# Patient Record
Sex: MALE | Race: WHITE | NOT HISPANIC OR LATINO | URBAN - METROPOLITAN AREA
[De-identification: names, ages, dates, MRNs, and addresses within clinical notes are randomized per-mention and may not be internally consistent; named-entity substitution may affect disease eponyms.]

---

## 2024-10-05 ENCOUNTER — APPOINTMENT (OUTPATIENT)
Dept: GENERAL RADIOLOGY | Facility: HOSPITAL | Age: 64
End: 2024-10-05
Payer: MEDICARE

## 2024-10-05 ENCOUNTER — HOSPITAL ENCOUNTER (INPATIENT)
Facility: HOSPITAL | Age: 64
LOS: 2 days | Discharge: HOME | End: 2024-10-07
Attending: EMERGENCY MEDICINE | Admitting: STUDENT IN AN ORGANIZED HEALTH CARE EDUCATION/TRAINING PROGRAM
Payer: MEDICARE

## 2024-10-05 DIAGNOSIS — I21.11 ST ELEVATION MYOCARDIAL INFARCTION INVOLVING RIGHT CORONARY ARTERY: ICD-10-CM

## 2024-10-05 DIAGNOSIS — I21.3 ST ELEVATION MYOCARDIAL INFARCTION (STEMI), UNSPECIFIED ARTERY: Primary | ICD-10-CM

## 2024-10-05 PROBLEM — F17.210 CONTINUOUS DEPENDENCE ON CIGARETTE SMOKING: Status: ACTIVE | Noted: 2021-11-03

## 2024-10-05 LAB
ACT BLD: 348 SECONDS (ref 89–137)
ALBUMIN SERPL-MCNC: 4.1 G/DL (ref 3.5–5.2)
ALBUMIN/GLOB SERPL: 1.3 G/DL
ALP SERPL-CCNC: 92 U/L (ref 39–117)
ALT SERPL W P-5'-P-CCNC: 28 U/L (ref 1–41)
ANION GAP SERPL CALCULATED.3IONS-SCNC: 13.8 MMOL/L (ref 5–15)
ANION GAP SERPL CALCULATED.3IONS-SCNC: 7.8 MMOL/L (ref 5–15)
AST SERPL-CCNC: 24 U/L (ref 1–40)
BASOPHILS # BLD AUTO: 0.08 10*3/MM3 (ref 0–0.2)
BASOPHILS NFR BLD AUTO: 0.8 % (ref 0–1.5)
BILIRUB SERPL-MCNC: 0.7 MG/DL (ref 0–1.2)
BUN SERPL-MCNC: 13 MG/DL (ref 8–23)
BUN SERPL-MCNC: 14 MG/DL (ref 8–23)
BUN/CREAT SERPL: 14.9 (ref 7–25)
BUN/CREAT SERPL: 17.7 (ref 7–25)
CALCIUM SPEC-SCNC: 8.9 MG/DL (ref 8.6–10.5)
CALCIUM SPEC-SCNC: 9.2 MG/DL (ref 8.6–10.5)
CHLORIDE SERPL-SCNC: 99 MMOL/L (ref 98–107)
CHLORIDE SERPL-SCNC: 99 MMOL/L (ref 98–107)
CO2 SERPL-SCNC: 23.2 MMOL/L (ref 22–29)
CO2 SERPL-SCNC: 23.2 MMOL/L (ref 22–29)
CREAT SERPL-MCNC: 0.79 MG/DL (ref 0.76–1.27)
CREAT SERPL-MCNC: 0.87 MG/DL (ref 0.76–1.27)
DEPRECATED RDW RBC AUTO: 42.3 FL (ref 37–54)
EGFRCR SERPLBLD CKD-EPI 2021: 96.4 ML/MIN/1.73
EGFRCR SERPLBLD CKD-EPI 2021: 99.2 ML/MIN/1.73
EOSINOPHIL # BLD AUTO: 0.35 10*3/MM3 (ref 0–0.4)
EOSINOPHIL NFR BLD AUTO: 3.6 % (ref 0.3–6.2)
ERYTHROCYTE [DISTWIDTH] IN BLOOD BY AUTOMATED COUNT: 13.3 % (ref 12.3–15.4)
GEN 5 2HR TROPONIN T REFLEX: 338 NG/L
GLOBULIN UR ELPH-MCNC: 3.2 GM/DL
GLUCOSE SERPL-MCNC: 137 MG/DL (ref 65–99)
GLUCOSE SERPL-MCNC: 184 MG/DL (ref 65–99)
HCT VFR BLD AUTO: 47.4 % (ref 37.5–51)
HGB BLD-MCNC: 16.2 G/DL (ref 13–17.7)
HOLD SPECIMEN: NORMAL
HOLD SPECIMEN: NORMAL
IMM GRANULOCYTES # BLD AUTO: 0.05 10*3/MM3 (ref 0–0.05)
IMM GRANULOCYTES NFR BLD AUTO: 0.5 % (ref 0–0.5)
LIPASE SERPL-CCNC: 42 U/L (ref 13–60)
LYMPHOCYTES # BLD AUTO: 1.99 10*3/MM3 (ref 0.7–3.1)
LYMPHOCYTES NFR BLD AUTO: 20.5 % (ref 19.6–45.3)
MAGNESIUM SERPL-MCNC: 2 MG/DL (ref 1.6–2.4)
MCH RBC QN AUTO: 29.8 PG (ref 26.6–33)
MCHC RBC AUTO-ENTMCNC: 34.2 G/DL (ref 31.5–35.7)
MCV RBC AUTO: 87.1 FL (ref 79–97)
MONOCYTES # BLD AUTO: 0.63 10*3/MM3 (ref 0.1–0.9)
MONOCYTES NFR BLD AUTO: 6.5 % (ref 5–12)
NEUTROPHILS NFR BLD AUTO: 6.61 10*3/MM3 (ref 1.7–7)
NEUTROPHILS NFR BLD AUTO: 68.1 % (ref 42.7–76)
NRBC BLD AUTO-RTO: 0 /100 WBC (ref 0–0.2)
NT-PROBNP SERPL-MCNC: 70.5 PG/ML (ref 0–900)
PLATELET # BLD AUTO: 209 10*3/MM3 (ref 140–450)
PMV BLD AUTO: 9.5 FL (ref 6–12)
POTASSIUM SERPL-SCNC: 4.1 MMOL/L (ref 3.5–5.2)
POTASSIUM SERPL-SCNC: 4.1 MMOL/L (ref 3.5–5.2)
PROT SERPL-MCNC: 7.3 G/DL (ref 6–8.5)
QT INTERVAL: 400 MS
QT INTERVAL: 449 MS
QTC INTERVAL: 431 MS
QTC INTERVAL: 442 MS
RBC # BLD AUTO: 5.44 10*6/MM3 (ref 4.14–5.8)
SODIUM SERPL-SCNC: 130 MMOL/L (ref 136–145)
SODIUM SERPL-SCNC: 136 MMOL/L (ref 136–145)
TROPONIN T DELTA: 307 NG/L
TROPONIN T SERPL HS-MCNC: 31 NG/L
WBC NRBC COR # BLD AUTO: 9.71 10*3/MM3 (ref 3.4–10.8)
WHOLE BLOOD HOLD COAG: NORMAL
WHOLE BLOOD HOLD SPECIMEN: NORMAL

## 2024-10-05 PROCEDURE — C1757 CATH, THROMBECTOMY/EMBOLECT: HCPCS | Performed by: STUDENT IN AN ORGANIZED HEALTH CARE EDUCATION/TRAINING PROGRAM

## 2024-10-05 PROCEDURE — C9606 PERC D-E COR REVASC W AMI S: HCPCS | Performed by: STUDENT IN AN ORGANIZED HEALTH CARE EDUCATION/TRAINING PROGRAM

## 2024-10-05 PROCEDURE — C1769 GUIDE WIRE: HCPCS | Performed by: STUDENT IN AN ORGANIZED HEALTH CARE EDUCATION/TRAINING PROGRAM

## 2024-10-05 PROCEDURE — C1887 CATHETER, GUIDING: HCPCS | Performed by: STUDENT IN AN ORGANIZED HEALTH CARE EDUCATION/TRAINING PROGRAM

## 2024-10-05 PROCEDURE — 93005 ELECTROCARDIOGRAM TRACING: CPT | Performed by: STUDENT IN AN ORGANIZED HEALTH CARE EDUCATION/TRAINING PROGRAM

## 2024-10-05 PROCEDURE — 99223 1ST HOSP IP/OBS HIGH 75: CPT | Performed by: INTERNAL MEDICINE

## 2024-10-05 PROCEDURE — C1753 CATH, INTRAVAS ULTRASOUND: HCPCS | Performed by: STUDENT IN AN ORGANIZED HEALTH CARE EDUCATION/TRAINING PROGRAM

## 2024-10-05 PROCEDURE — 99152 MOD SED SAME PHYS/QHP 5/>YRS: CPT | Performed by: STUDENT IN AN ORGANIZED HEALTH CARE EDUCATION/TRAINING PROGRAM

## 2024-10-05 PROCEDURE — 36415 COLL VENOUS BLD VENIPUNCTURE: CPT | Performed by: STUDENT IN AN ORGANIZED HEALTH CARE EDUCATION/TRAINING PROGRAM

## 2024-10-05 PROCEDURE — 25010000002 MIDAZOLAM PER 1MG: Performed by: STUDENT IN AN ORGANIZED HEALTH CARE EDUCATION/TRAINING PROGRAM

## 2024-10-05 PROCEDURE — 25510000001 IOPAMIDOL PER 1 ML: Performed by: STUDENT IN AN ORGANIZED HEALTH CARE EDUCATION/TRAINING PROGRAM

## 2024-10-05 PROCEDURE — 25010000002 HEPARIN (PORCINE) PER 1000 UNITS: Performed by: STUDENT IN AN ORGANIZED HEALTH CARE EDUCATION/TRAINING PROGRAM

## 2024-10-05 PROCEDURE — 4A023N7 MEASUREMENT OF CARDIAC SAMPLING AND PRESSURE, LEFT HEART, PERCUTANEOUS APPROACH: ICD-10-PCS | Performed by: STUDENT IN AN ORGANIZED HEALTH CARE EDUCATION/TRAINING PROGRAM

## 2024-10-05 PROCEDURE — C1725 CATH, TRANSLUMIN NON-LASER: HCPCS | Performed by: STUDENT IN AN ORGANIZED HEALTH CARE EDUCATION/TRAINING PROGRAM

## 2024-10-05 PROCEDURE — B2151ZZ FLUOROSCOPY OF LEFT HEART USING LOW OSMOLAR CONTRAST: ICD-10-PCS | Performed by: STUDENT IN AN ORGANIZED HEALTH CARE EDUCATION/TRAINING PROGRAM

## 2024-10-05 PROCEDURE — 93458 L HRT ARTERY/VENTRICLE ANGIO: CPT | Performed by: STUDENT IN AN ORGANIZED HEALTH CARE EDUCATION/TRAINING PROGRAM

## 2024-10-05 PROCEDURE — 25010000002 ATROPINE SULFATE 1 MG/10ML SOLUTION PREFILLED SYRINGE: Performed by: STUDENT IN AN ORGANIZED HEALTH CARE EDUCATION/TRAINING PROGRAM

## 2024-10-05 PROCEDURE — 80053 COMPREHEN METABOLIC PANEL: CPT | Performed by: EMERGENCY MEDICINE

## 2024-10-05 PROCEDURE — 83880 ASSAY OF NATRIURETIC PEPTIDE: CPT | Performed by: EMERGENCY MEDICINE

## 2024-10-05 PROCEDURE — B2111ZZ FLUOROSCOPY OF MULTIPLE CORONARY ARTERIES USING LOW OSMOLAR CONTRAST: ICD-10-PCS | Performed by: STUDENT IN AN ORGANIZED HEALTH CARE EDUCATION/TRAINING PROGRAM

## 2024-10-05 PROCEDURE — 71045 X-RAY EXAM CHEST 1 VIEW: CPT

## 2024-10-05 PROCEDURE — 92941 PRQ TRLML REVSC TOT OCCL AMI: CPT | Performed by: STUDENT IN AN ORGANIZED HEALTH CARE EDUCATION/TRAINING PROGRAM

## 2024-10-05 PROCEDURE — 25010000002 HEPARIN (PORCINE) PER 1000 UNITS: Performed by: EMERGENCY MEDICINE

## 2024-10-05 PROCEDURE — 83735 ASSAY OF MAGNESIUM: CPT | Performed by: EMERGENCY MEDICINE

## 2024-10-05 PROCEDURE — 25010000002 HYDROMORPHONE 1 MG/ML SOLUTION: Performed by: EMERGENCY MEDICINE

## 2024-10-05 PROCEDURE — 92978 ENDOLUMINL IVUS OCT C 1ST: CPT | Performed by: STUDENT IN AN ORGANIZED HEALTH CARE EDUCATION/TRAINING PROGRAM

## 2024-10-05 PROCEDURE — 027034Z DILATION OF CORONARY ARTERY, ONE ARTERY WITH DRUG-ELUTING INTRALUMINAL DEVICE, PERCUTANEOUS APPROACH: ICD-10-PCS | Performed by: STUDENT IN AN ORGANIZED HEALTH CARE EDUCATION/TRAINING PROGRAM

## 2024-10-05 PROCEDURE — C1874 STENT, COATED/COV W/DEL SYS: HCPCS | Performed by: STUDENT IN AN ORGANIZED HEALTH CARE EDUCATION/TRAINING PROGRAM

## 2024-10-05 PROCEDURE — 85347 COAGULATION TIME ACTIVATED: CPT

## 2024-10-05 PROCEDURE — C1760 CLOSURE DEV, VASC: HCPCS | Performed by: STUDENT IN AN ORGANIZED HEALTH CARE EDUCATION/TRAINING PROGRAM

## 2024-10-05 PROCEDURE — C1894 INTRO/SHEATH, NON-LASER: HCPCS | Performed by: STUDENT IN AN ORGANIZED HEALTH CARE EDUCATION/TRAINING PROGRAM

## 2024-10-05 PROCEDURE — 83690 ASSAY OF LIPASE: CPT | Performed by: EMERGENCY MEDICINE

## 2024-10-05 PROCEDURE — 92973 PRQ TRLUML C MCHN ASP THRMBC: CPT | Performed by: STUDENT IN AN ORGANIZED HEALTH CARE EDUCATION/TRAINING PROGRAM

## 2024-10-05 PROCEDURE — 99291 CRITICAL CARE FIRST HOUR: CPT

## 2024-10-05 PROCEDURE — 02C00ZZ EXTIRPATION OF MATTER FROM CORONARY ARTERY, ONE ARTERY, OPEN APPROACH: ICD-10-PCS | Performed by: STUDENT IN AN ORGANIZED HEALTH CARE EDUCATION/TRAINING PROGRAM

## 2024-10-05 PROCEDURE — 99223 1ST HOSP IP/OBS HIGH 75: CPT | Performed by: STUDENT IN AN ORGANIZED HEALTH CARE EDUCATION/TRAINING PROGRAM

## 2024-10-05 PROCEDURE — 93005 ELECTROCARDIOGRAM TRACING: CPT | Performed by: EMERGENCY MEDICINE

## 2024-10-05 PROCEDURE — 84484 ASSAY OF TROPONIN QUANT: CPT | Performed by: EMERGENCY MEDICINE

## 2024-10-05 PROCEDURE — 85025 COMPLETE CBC W/AUTO DIFF WBC: CPT | Performed by: EMERGENCY MEDICINE

## 2024-10-05 PROCEDURE — 25010000002 FENTANYL CITRATE (PF) 100 MCG/2ML SOLUTION: Performed by: STUDENT IN AN ORGANIZED HEALTH CARE EDUCATION/TRAINING PROGRAM

## 2024-10-05 PROCEDURE — 25010000002 DIPHENHYDRAMINE PER 50 MG: Performed by: STUDENT IN AN ORGANIZED HEALTH CARE EDUCATION/TRAINING PROGRAM

## 2024-10-05 PROCEDURE — B240ZZ3 ULTRASONOGRAPHY OF SINGLE CORONARY ARTERY, INTRAVASCULAR: ICD-10-PCS | Performed by: STUDENT IN AN ORGANIZED HEALTH CARE EDUCATION/TRAINING PROGRAM

## 2024-10-05 PROCEDURE — C9460 INJECTION, CANGRELOR: HCPCS | Performed by: STUDENT IN AN ORGANIZED HEALTH CARE EDUCATION/TRAINING PROGRAM

## 2024-10-05 PROCEDURE — 25010000002 CANGRELOR TETRASODIUM 50 MG RECONSTITUTED SOLUTION 1 EACH VIAL: Performed by: STUDENT IN AN ORGANIZED HEALTH CARE EDUCATION/TRAINING PROGRAM

## 2024-10-05 PROCEDURE — 99153 MOD SED SAME PHYS/QHP EA: CPT | Performed by: STUDENT IN AN ORGANIZED HEALTH CARE EDUCATION/TRAINING PROGRAM

## 2024-10-05 PROCEDURE — 25010000002 LIDOCAINE 2% SOLUTION: Performed by: STUDENT IN AN ORGANIZED HEALTH CARE EDUCATION/TRAINING PROGRAM

## 2024-10-05 DEVICE — XIENCE SKYPOINT™ EVEROLIMUS ELUTING CORONARY STENT SYSTEM 3.00 MM X 48 MM / RAPID-EXCHANGE
Type: IMPLANTABLE DEVICE | Site: HEART | Status: FUNCTIONAL
Brand: XIENCE SKYPOINT™

## 2024-10-05 RX ORDER — HEPARIN SODIUM 5000 [USP'U]/ML
INJECTION, SOLUTION INTRAVENOUS; SUBCUTANEOUS
Status: COMPLETED | OUTPATIENT
Start: 2024-10-05 | End: 2024-10-05

## 2024-10-05 RX ORDER — HEPARIN SODIUM 1000 [USP'U]/ML
INJECTION, SOLUTION INTRAVENOUS; SUBCUTANEOUS
Status: DISCONTINUED | OUTPATIENT
Start: 2024-10-05 | End: 2024-10-05 | Stop reason: HOSPADM

## 2024-10-05 RX ORDER — LIDOCAINE HYDROCHLORIDE 20 MG/ML
INJECTION, SOLUTION INFILTRATION; PERINEURAL
Status: DISCONTINUED | OUTPATIENT
Start: 2024-10-05 | End: 2024-10-05 | Stop reason: HOSPADM

## 2024-10-05 RX ORDER — IOPAMIDOL 755 MG/ML
INJECTION, SOLUTION INTRAVASCULAR
Status: DISCONTINUED | OUTPATIENT
Start: 2024-10-05 | End: 2024-10-05 | Stop reason: HOSPADM

## 2024-10-05 RX ORDER — AMOXICILLIN 250 MG
2 CAPSULE ORAL 2 TIMES DAILY PRN
Status: DISCONTINUED | OUTPATIENT
Start: 2024-10-05 | End: 2024-10-07 | Stop reason: HOSPADM

## 2024-10-05 RX ORDER — FENTANYL CITRATE 50 UG/ML
INJECTION, SOLUTION INTRAMUSCULAR; INTRAVENOUS
Status: DISCONTINUED | OUTPATIENT
Start: 2024-10-05 | End: 2024-10-05 | Stop reason: HOSPADM

## 2024-10-05 RX ORDER — NITROGLYCERIN 0.4 MG/1
0.4 TABLET SUBLINGUAL
Status: DISCONTINUED | OUTPATIENT
Start: 2024-10-05 | End: 2024-10-05

## 2024-10-05 RX ORDER — ASPIRIN 81 MG/1
81 TABLET ORAL DAILY
Status: DISCONTINUED | OUTPATIENT
Start: 2024-10-05 | End: 2024-10-07 | Stop reason: HOSPADM

## 2024-10-05 RX ORDER — SODIUM CHLORIDE 9 MG/ML
40 INJECTION, SOLUTION INTRAVENOUS AS NEEDED
Status: ACTIVE | OUTPATIENT
Start: 2024-10-05 | End: 2024-10-05

## 2024-10-05 RX ORDER — SODIUM CHLORIDE 0.9 % (FLUSH) 0.9 %
10 SYRINGE (ML) INJECTION AS NEEDED
Status: DISCONTINUED | OUTPATIENT
Start: 2024-10-05 | End: 2024-10-07 | Stop reason: HOSPADM

## 2024-10-05 RX ORDER — ASPIRIN 325 MG
325 TABLET ORAL DAILY
COMMUNITY
End: 2024-10-07 | Stop reason: HOSPADM

## 2024-10-05 RX ORDER — ROSUVASTATIN CALCIUM 20 MG/1
40 TABLET, COATED ORAL NIGHTLY
Status: DISCONTINUED | OUTPATIENT
Start: 2024-10-05 | End: 2024-10-07 | Stop reason: HOSPADM

## 2024-10-05 RX ORDER — ASPIRIN 81 MG/1
324 TABLET, CHEWABLE ORAL ONCE
Status: COMPLETED | OUTPATIENT
Start: 2024-10-05 | End: 2024-10-05

## 2024-10-05 RX ORDER — DIPHENHYDRAMINE HYDROCHLORIDE 50 MG/ML
25 INJECTION INTRAMUSCULAR; INTRAVENOUS ONCE
Status: COMPLETED | OUTPATIENT
Start: 2024-10-05 | End: 2024-10-05

## 2024-10-05 RX ORDER — NITROGLYCERIN 0.4 MG/1
0.4 TABLET SUBLINGUAL
Status: DISCONTINUED | OUTPATIENT
Start: 2024-10-05 | End: 2024-10-07 | Stop reason: HOSPADM

## 2024-10-05 RX ORDER — ACETAMINOPHEN 325 MG/1
650 TABLET ORAL EVERY 4 HOURS PRN
Status: DISCONTINUED | OUTPATIENT
Start: 2024-10-05 | End: 2024-10-07 | Stop reason: HOSPADM

## 2024-10-05 RX ORDER — MIDAZOLAM HYDROCHLORIDE 2 MG/2ML
INJECTION, SOLUTION INTRAMUSCULAR; INTRAVENOUS
Status: DISCONTINUED | OUTPATIENT
Start: 2024-10-05 | End: 2024-10-05 | Stop reason: HOSPADM

## 2024-10-05 RX ORDER — POLYETHYLENE GLYCOL 3350 17 G/17G
17 POWDER, FOR SOLUTION ORAL DAILY PRN
Status: DISCONTINUED | OUTPATIENT
Start: 2024-10-05 | End: 2024-10-07 | Stop reason: HOSPADM

## 2024-10-05 RX ORDER — ATORVASTATIN CALCIUM 10 MG/1
TABLET, FILM COATED ORAL
Status: COMPLETED | OUTPATIENT
Start: 2024-10-05 | End: 2024-10-05

## 2024-10-05 RX ORDER — ATROPINE SULFATE 0.1 MG/ML
INJECTION INTRAVENOUS
Status: DISCONTINUED | OUTPATIENT
Start: 2024-10-05 | End: 2024-10-05 | Stop reason: HOSPADM

## 2024-10-05 RX ORDER — BISACODYL 10 MG
10 SUPPOSITORY, RECTAL RECTAL DAILY PRN
Status: DISCONTINUED | OUTPATIENT
Start: 2024-10-05 | End: 2024-10-07 | Stop reason: HOSPADM

## 2024-10-05 RX ORDER — LIDOCAINE 4 G/G
1 PATCH TOPICAL
Status: DISCONTINUED | OUTPATIENT
Start: 2024-10-05 | End: 2024-10-07 | Stop reason: HOSPADM

## 2024-10-05 RX ORDER — BISACODYL 5 MG/1
5 TABLET, DELAYED RELEASE ORAL DAILY PRN
Status: DISCONTINUED | OUTPATIENT
Start: 2024-10-05 | End: 2024-10-07 | Stop reason: HOSPADM

## 2024-10-05 RX ORDER — SODIUM CHLORIDE 0.9 % (FLUSH) 0.9 %
10 SYRINGE (ML) INJECTION EVERY 12 HOURS SCHEDULED
Status: DISCONTINUED | OUTPATIENT
Start: 2024-10-05 | End: 2024-10-07 | Stop reason: HOSPADM

## 2024-10-05 RX ADMIN — ASPIRIN 81 MG: 81 TABLET, COATED ORAL at 08:09

## 2024-10-05 RX ADMIN — DIPHENHYDRAMINE HYDROCHLORIDE 25 MG: 50 INJECTION, SOLUTION INTRAMUSCULAR; INTRAVENOUS at 15:23

## 2024-10-05 RX ADMIN — HEPARIN SODIUM 5418 UNITS: 5000 INJECTION INTRAVENOUS; SUBCUTANEOUS at 04:18

## 2024-10-05 RX ADMIN — TICAGRELOR 90 MG: 90 TABLET ORAL at 20:48

## 2024-10-05 RX ADMIN — ASPIRIN 81 MG 324 MG: 81 TABLET ORAL at 04:13

## 2024-10-05 RX ADMIN — ATORVASTATIN CALCIUM 40 MG: 10 TABLET, FILM COATED ORAL at 04:24

## 2024-10-05 RX ADMIN — Medication 10 ML: at 20:48

## 2024-10-05 RX ADMIN — Medication 10 ML: at 08:10

## 2024-10-05 RX ADMIN — TICAGRELOR 90 MG: 90 TABLET ORAL at 08:09

## 2024-10-05 RX ADMIN — HYDROMORPHONE HYDROCHLORIDE 1 MG: 1 INJECTION, SOLUTION INTRAMUSCULAR; INTRAVENOUS; SUBCUTANEOUS at 04:27

## 2024-10-05 RX ADMIN — ROSUVASTATIN 40 MG: 20 TABLET, FILM COATED ORAL at 20:48

## 2024-10-05 NOTE — Clinical Note
First balloon inflation max pressure = 14 alvin. First balloon inflation duration = 10 seconds. Second inflation of balloon - Max pressure = 14 alvin. 2nd Inflation of balloon - Duration = 15 seconds. Third inflation of balloon - Max pressure = 12 alvin. 3rd Inflation of balloon - Duration = 8 seconds.

## 2024-10-05 NOTE — Clinical Note
First balloon inflation max pressure = 14 alvin. First balloon inflation duration = 8 seconds. Second inflation of balloon - Max pressure = 16 alvin. 2nd Inflation of balloon - Duration = 20 seconds.

## 2024-10-05 NOTE — H&P
Cardiology H&P  [facility] CORONARY CARE UNIT          Patient Identification:  Jaren Benedict      [id number]  63 y.o.        male  1960           PCP: Megan Alvarado APRN    History of Present Illness:  Mr. Benedict is a 63-year-old male with no known past medical history presents to the ER with a complaint of left arm pain associated with chest pain.    Mr. Benedict stated that 2 hours prior to presentation to ER he started noticing sudden onset of left arm burning pain which subsequently radiated to his central chest and felt like pressure.  He tried some conservative measures which did not alleviate his symptoms at which point he decided to come to ER for evaluation.  He described his chest pain to be severe in nature localized to center of his chest started at rest and was not relieving with conservative measures.  Initial workup indicated, inferior wall ST elevation MI.  Cardiology team was consulted for evaluation.  Patient was taken to cardiac catheterization lab where he was noted to have 100% proximal RCA segment occlusion which was successfully revascularized.    Patient was then admitted to CCU team for post STEMI management.  Patient denied orthopnea, PND, peripheral edema, exertional dyspnea prior to presentation to ER.    He has no known history of diabetes, hypertension, stroke or kidney disease.  Does not have family history of coronary artery disease either.  He works as a .  He has extensive smoking history.  He denies doing drugs or alcohol.      Past History:  History reviewed. No pertinent past medical history.  No past surgical history on file.  No Known Allergies  Social History     Socioeconomic History    Marital status:    Tobacco Use    Smoking status: Every Day     Current packs/day: 1.00     Average packs/day: 1 pack/day for 50.0 years (50.0 ttl pk-yrs)     Types: Cigarettes     Start date: 9/18/1974     Passive exposure: Never    Tobacco comments:       "SMITHA DISCUSSED WITH PATIENT ON IMPORTANCE OF CESSATION   Vaping Use    Vaping status: Never Used   Substance and Sexual Activity    Alcohol use: Defer    Drug use: Never    Sexual activity: Defer     History reviewed. No pertinent family history.    Medications:  Prior to Admission medications    Medication Sig Start Date End Date Taking? Authorizing Provider   aspirin 325 MG tablet Take 1 tablet by mouth Daily.   Yes Provider, MD Regino      Current medications:  aspirin, 81 mg, Oral, Daily  Lidocaine, 1 patch, Transdermal, Q24H  rosuvastatin, 40 mg, Oral, Nightly  sodium chloride, 10 mL, Intravenous, Q12H  ticagrelor, 90 mg, Oral, BID      Current IV drips:       ROS  Review of system is negative other than HPI.      Physical Exam    /80 (BP Location: Left arm)   Pulse 62   Temp 97.8 °F (36.6 °C) (Oral)   Resp 10   Ht 170.2 cm (67\")   Wt 85.4 kg (188 lb 4.4 oz)   SpO2 93%   BMI 29.49 kg/m²  Body mass index is 29.49 kg/m².   Oxygen saturation   @FLOWAN(10::1)@ SpO2  Min: 92 %  Max: 96 %    Constitutional:  Awake. Not in acute distress. Normal appearance.   Neck: No carotid bruit, hepatojugular reflux or JVD.   Cardiovascular:      Rate and Rhythm: Normal rate and regular rhythm.      Chest Wall: PMI is not displaced.      Heart sounds: Normal heart sounds, S1 normal and S2 normal. No murmur heard.       No friction rub. No gallop. No S3 or S4 sounds.    Pulmonary: Pulmonary effort is normal. Normal breath sounds. No wheezing, rhonchi or rales.   Extremities: No Bilateral edema is noted.   Skin: Warm and dry. Non cyanotic, No petechiae or rash.   Neurological: Alert and oriented x 3    Cardiographics:     ECG  (personally reviewed) inferior wall STEMI.  No prior EKGs available for comparison   Telemetry:  (personally reviewed) normal sinus rhythm with intermittent occasional PVCs          No results found for this or any previous visit.      Cardiolite (Tc-99m Sestamibi) stress test   Lab Review: " "      CBC          10/5/2024    04:20   CBC   WBC 9.71    RBC 5.44    Hemoglobin 16.2    Hematocrit 47.4    MCV 87.1    MCH 29.8    MCHC 34.2    RDW 13.3    Platelets 209        CMP          10/5/2024    04:20 10/5/2024    06:53   CMP   Glucose 184  137    BUN 13  14    Creatinine 0.87  0.79    EGFR 96.4  99.2    Sodium 136  130    Potassium 4.1  4.1    Chloride 99  99    Calcium 9.2  8.9    Total Protein 7.3     Albumin 4.1     Globulin 3.2     Total Bilirubin 0.7     Alkaline Phosphatase 92     AST (SGOT) 24     ALT (SGPT) 28     Albumin/Globulin Ratio 1.3     BUN/Creatinine Ratio 14.9  17.7    Anion Gap 13.8  7.8         CARDIAC LABS:      Lab 10/05/24  0653 10/05/24  0420   PROBNP  --  70.5   HSTROP T 338* 31*      No results found for: \"DIGOXIN\"   No results found for: \"TSH\"        Invalid input(s): \"LDLCALC\"  No results found for: \"POCTROP\"  No results found for: \"DDIMERQUAN\"  Lab Results   Component Value Date    MG 2.0 10/05/2024             CARDIAC LABS:      Lab 10/05/24  0653 10/05/24  0420   PROBNP  --  70.5   HSTROP T 338* 31*        Imaging:  CXR  No evidence of pulmonary edema   Assessment:  Inferior wall ST elevation MI  Tobacco use disorder  Overweight    Plan:  Patient was taken to Cath Lab, his coronary angiogram showed proximal RCA segment 100% acute thrombotic occlusion which was successfully revascularized with IVUS guided 3 x 48 mm drug-eluting stent optimized with 3.5 x 15 mm balloon to achieve KARISSA-3 flow with 0% residual stenosis.    Patient was started on cangrelor drip and loading dose of Brilinta was administered in the Cath Lab.  Continue aspirin 81 mg p.o. daily and Brilinta 90 mg p.o. twice daily  Screen for diabetes.  Lipid panel has been ordered.  Goal is to achieve greater than 40% reduction in LDL and triglycerides in 6 to 8 weeks.  Started Crestor 40 mg p.o. daily  Continue to monitor on telemetry for ventricular arrhythmias.    Guideline directed medical therapy initiated for " coronary artery disease.  LV gram was unremarkable for VSD.  Inferior wall hypokinesis was noted on LV gram.  Estimated LVEF is 40 to 45%.  Echocardiogram has been ordered for further evaluation.      Thank you for allowing us to share in [facility]. Please call with any questions or concerns.       I have seen and examined the patient. I spent 45 mins caring for the patient on this date of service. This time includes time spent by me in the following activities as necessary: face to face encounter, preparing for the visit, reviewing tests, specialists records and previous visits, obtaining and/or reviewing a separately obtained history, performing a medically appropriate exam and/or evaluation, counseling and educating the patient, family, caregiver, referring and/or communicating with other healthcare professionals, documenting information in the medical record, independently interpreting results and communicating that information with the patient, family, caregiver, and developing a medically appropriate treatment plan with consideration of other conditions, medications, and treatments. More than 50 % time was spent in counselling and patient education.         Keshav Marcano MD   10/5/2024    13:49 EDT

## 2024-10-05 NOTE — CONSULTS
Pulmonary / Critical Care Consult Note      Patient Name: Jaren Benedict  : 1960  MRN: [id number]  Primary Care Physician:  Megan Alvarado APRN  Referring Physician: Keshav Marcano MD  Date of admission: 10/5/2024    Subjective   Subjective     Reason for Consult/ Chief Complaint:   Chest pain, SP cardiac catheterization    HPI:  Jaren Benedict is a 63 y.o. male came in with chest pain.  Patient was found to have STEMI and was taken to Cath Lab.  Had stent placed to his RCA for 100% proximal occlusion.  Did have significant chest pain and sudden onset of his left arm which is markedly improved after cath.  Denies any dyspnea at this time.  No fevers        Personal History     History reviewed. No pertinent past medical history.    No past surgical history on file.    Family History: family history is not on file. Otherwise pertinent FHx was reviewed and not pertinent to current issue.    Social History:  reports that he has been smoking cigarettes. He started smoking about 50 years ago. He has a 50 pack-year smoking history. He has never been exposed to tobacco smoke. He does not have any smokeless tobacco history on file. Alcohol use questions deferred to the physician. He reports that he does not use drugs.    Home Medications:  aspirin    Allergies:  No Known Allergies    Objective    Objective     Vitals:   Temp:  [97.4 °F (36.3 °C)-98.9 °F (37.2 °C)] 97.8 °F (36.6 °C)  Heart Rate:  [55-81] 63  Resp:  [10-18] 10  BP: (104-157)/() 123/84  Flow (L/min):  [2] 2    Physical Exam:  Vital Signs Reviewed   General:  WDWN, Alert, NAD.    HEENT:  PERRL, EOMI.  OP, nares clear, no sinus tenderness  Neck:  Supple, no JVD, no thyromegaly  Lymph: no axillary, cervical, supraclavicular lymphadenopathy noted bilaterally  Chest:  good aeration, clear to auscultation bilaterally, tympanic to percussion bilaterally, no work of breathing noted  CV: RRR, no MGR, pulses 2+, equal.  Abd:  Soft, NT, ND, + BS, no  HSM  EXT:  no clubbing, no cyanosis, no edema, no joint tenderness  Neuro:  A&Ox3, CN grossly intact, no focal deficits.  Skin: No rashes or lesions noted      Result Review    Result Review:  I have personally reviewed the results from the time of this admission to 10/5/2024 14:35 EDT and agree with these findings:  [x]  Laboratory  []  Microbiology  [x]  Radiology  [x]  EKG/Telemetry   [x]  Cardiology/Vascular   []  Pathology  []  Old records  []  Other:  Most notable findings include:       Lab 10/05/24  0653 10/05/24  0420   WBC  --  9.71   HEMOGLOBIN  --  16.2   HEMATOCRIT  --  47.4   PLATELETS  --  209   SODIUM 130* 136   POTASSIUM 4.1 4.1   CHLORIDE 99 99   CO2 23.2 23.2   BUN 14 13   CREATININE 0.79 0.87   GLUCOSE 137* 184*   CALCIUM 8.9 9.2   TOTAL PROTEIN  --  7.3   ALBUMIN  --  4.1   GLOBULIN  --  3.2     Chest x-ray with clear lung fields with no airspace disease    Results for orders placed during the hospital encounter of 10/05/24    Cardiac Catheterization/Vascular Study  Conclusions:  Inferior wall ST elevation MI  Status post successful primary PCI with 3 x 48 mm drug-eluting stent optimized with 3.5 x 15 mm NC balloon to yield 0% residual stenosis and KARISSA-3 flow.  Successful mechanical aspiration thrombectomy restoring KARISSA-3 flow    Recommendations:  Continue uninterrupted DAPT for at least 1 year  Medical management for secondary prevention of coronary artery disease  Smoking cessation counseling  Staged PCI of LAD within 4 weeks      Assessment & Plan   Assessment / Plan     Active Hospital Problems:  Active Hospital Problems    Diagnosis     **STEMI (ST elevation myocardial infarction)        Impression:  Inferior wall STEMI status post PCI to RCA  Hyponatremia, clinically insignificant  Hyperglycemia  Tobacco abuse of cigarettes    Plan:  Appreciate cardiology assistance.  Status post PCI to RCA  Continue dual antiplatelet therapy for now  Continue statin  Lipid panel and A1c pending  Will  need cardiac rehab at discharge  Advance diet as tolerated  Smoking cessation encouraged.  Has nicotine patch available    VTE Prophylaxis:  Mechanical VTE prophylaxis orders are present.      Code Status and Medical Interventions: CPR (Attempt to Resuscitate); Full Support   Ordered at: 10/05/24 0727     Level Of Support Discussed With:    Patient     Code Status (Patient has no pulse and is not breathing):    CPR (Attempt to Resuscitate)     Medical Interventions (Patient has pulse or is breathing):    Full Support        Labs, imaging, microbiology, notes and medications personally reviewed  Discussed with primary    Thank you for involving me in the care of this patient.    Electronically signed by Timo Ferguson MD, 10/05/24, 2:37 PM EDT.

## 2024-10-05 NOTE — Clinical Note
First balloon inflation max pressure = 14 alvin. First balloon inflation duration = 20 seconds. Second inflation of balloon - Max pressure = 14 alvin. 2nd Inflation of balloon - Duration = 10 seconds. Third inflation of balloon - Max pressure = 16 alvin. 3rd Inflation of balloon - Duration = 25 seconds. Fourth inflation of balloon - Max pressure = 16 alvin. 4th Inflation of balloon - Duration = 20 seconds.

## 2024-10-05 NOTE — Clinical Note
First balloon inflation max pressure = 6 alvin. First balloon inflation duration = 10 seconds. Second inflation of balloon - Max pressure = 6 alvin. 2nd Inflation of balloon - Duration = 10 seconds. Third inflation of balloon - Max pressure = 10 alvin. 3rd Inflation of balloon - Duration = 10 seconds.

## 2024-10-05 NOTE — Clinical Note
First balloon inflation max pressure = 16 alvin. First balloon inflation duration = 8 seconds. Second inflation of balloon - Max pressure = 16 alvin. 2nd Inflation of balloon - Duration = 20 seconds. Third inflation of balloon - Max pressure = 15 alvin. 3rd Inflation of balloon - Duration = 14 seconds.

## 2024-10-05 NOTE — ED PROVIDER NOTES
Time: 4:16 AM EDT  Date of encounter:  10/5/2024  Independent Historian/Clinical History and Information was obtained by:   Patient and EMS    History is limited by: N/A    Chief Complaint: Chest pain      History of Present Illness:  Patient is a 63 y.o. year old male who presents to the emergency department for evaluation of chest pain    Patient reports onset of chest pain that woke him from sleep around 1230am    The pain is substernal and radiates out to his left elbow.  He denies any associated shortness of breath nausea or vomiting.  EMS reports administration of aspirin and fentanyl en route to the hospital with little relief of his pain.  Patient denies any previous cardiac history but states that he is a smoker.      Patient Care Team  Primary Care Provider: Megan Alvarado APRN    Past Medical History:     No Known Allergies  History reviewed. No pertinent past medical history.  No past surgical history on file.  History reviewed. No pertinent family history.    Home Medications:  Prior to Admission medications    Not on File        Social History:   Social History     Tobacco Use    Smoking status: Every Day     Current packs/day: 1.00     Average packs/day: 1 pack/day for 50.0 years (50.0 ttl pk-yrs)     Types: Cigarettes     Start date: 9/18/1974     Passive exposure: Never    Tobacco comments:     DR BONE DISCUSSED WITH PATIENT ON IMPORTANCE OF CESSATION   Vaping Use    Vaping status: Never Used   Substance Use Topics    Alcohol use: Defer    Drug use: Never         Review of Systems:  Review of Systems   Constitutional:  Negative for chills and fever.   HENT:  Negative for congestion, ear pain and sore throat.    Eyes:  Negative for pain.   Respiratory:  Negative for cough, chest tightness and shortness of breath.    Cardiovascular:  Positive for chest pain.   Gastrointestinal:  Negative for abdominal pain, diarrhea, nausea and vomiting.   Genitourinary:  Negative for flank pain and hematuria.  "  Musculoskeletal:  Negative for joint swelling.   Skin:  Negative for pallor.   Neurological:  Negative for seizures and headaches.   All other systems reviewed and are negative.       Physical Exam:  /98   Pulse 58   Temp 98.6 °F (37 °C) (Oral)   Resp 14   Ht 170.2 cm (67\")   Wt 85.4 kg (188 lb 4.4 oz)   SpO2 95%   BMI 29.49 kg/m²     Physical Exam  Vitals and nursing note reviewed.   Constitutional:       General: He is not in acute distress.     Appearance: Normal appearance. He is not toxic-appearing.      Comments: Patient appears uncomfortable and in pain holding his chest   HENT:      Head: Normocephalic and atraumatic.      Jaw: There is normal jaw occlusion.   Eyes:      General: Lids are normal.      Extraocular Movements: Extraocular movements intact.      Conjunctiva/sclera: Conjunctivae normal.      Pupils: Pupils are equal, round, and reactive to light.   Cardiovascular:      Rate and Rhythm: Normal rate and regular rhythm.      Pulses: Normal pulses.      Heart sounds: Normal heart sounds.   Pulmonary:      Effort: Pulmonary effort is normal. No respiratory distress.      Breath sounds: Normal breath sounds. No wheezing or rhonchi.   Abdominal:      General: Abdomen is flat.      Palpations: Abdomen is soft.      Tenderness: There is no abdominal tenderness. There is no guarding or rebound.   Musculoskeletal:         General: Normal range of motion.      Cervical back: Normal range of motion and neck supple.      Right lower leg: No edema.      Left lower leg: No edema.   Skin:     General: Skin is warm and dry.   Neurological:      Mental Status: He is alert and oriented to person, place, and time. Mental status is at baseline.   Psychiatric:         Mood and Affect: Mood normal.                Procedures:  Procedures      Medical Decision Making:      Comorbidities that affect care:    Smoking    External Notes reviewed:    None      The following orders were placed and all results " were independently analyzed by me:  Orders Placed This Encounter   Procedures    XR Chest 1 View    Willard Draw    High Sensitivity Troponin T    Comprehensive Metabolic Panel    Lipase    BNP    Magnesium    CBC Auto Differential    High Sensitivity Troponin T 2Hr    Hemoglobin A1c    Lipid Panel    Basic Metabolic Panel    CBC Auto Differential    Diet: Cardiac; Healthy Heart (2-3 Na+); Fluid Consistency: Thin (IDDSI 0)    Undress & Gown    Vital Signs, Site Check & Distal Extremity Check for Warmth, Color, Sensation & Pulses    Maintain IV Access    Telemetry - Place Orders & Notify Provider of Results When Patient Experiences Acute Chest Pain, Dysrhythmia or Respiratory Distress    Change Site Dressing    Encourage Fluids    Strict Intake & Output    Continuous Pulse Oximetry    Advance Diet As Tolerated -    Notify MD if platelet count is less than 100,000, is less than 1/2 baseline, or if Hgb drops by more than 3mg/dl.    Notify MD of hypotension (SBP less than 95), bleeding, or dysrythmia and follow Sheath Removal Policy if needed.    Hold metFORMIN (GLUCOPHAGE) for 48 Hours    Sheath Pulled in Lab    Assess Puncture Site, Vital Signs, Distal Pulses & Observe Site for Bleeding or Swelling    Head of Bed: Flat; 2 Hours; Elevate Head of Bed: 30 Degrees; 2 Hours; Activity Level: No Restrictions - Up Ad Lesa; If No Bleeding; Total Bedrest Time? 4 Hours    Vital Signs    Intake & Output    Weigh Patient    Oral Care    Place Sequential Compression Device    Maintain Sequential Compression Device    Telemetry - Place Orders & Notify Provider of Results When Patient Experiences Acute Chest Pain, Dysrhythmia or Respiratory Distress    Activity - Ad Lesa    Code Status and Medical Interventions: CPR (Attempt to Resuscitate); Full Support    Cardiac Rehab Evaluation and Enrollment    Oxygen Therapy- Nasal Cannula; Titrate 1-6 LPM Per SpO2; 90 - 95%    Oxygen Therapy- Nasal Cannula; Titrate 1-6 LPM Per SpO2; 90 - 95%     Incentive Spirometry    POC Activated Clotting Time    POC Activated Clotting Time    ECG 12 Lead ED Triage Standing Order; Chest Pain    ECG 12 Lead Pre-Op / Pre-Procedure    ECG 12 Lead Rhythm Change    Insert Peripheral IV    Insert Peripheral IV    Inpatient Admission    Inpatient Admission    CBC & Differential    Green Top (Gel)    Lavender Top    Gold Top - SST    Light Blue Top    CBC & Differential       Medications Given in the Emergency Department:  Medications   sodium chloride 0.9 % flush 10 mL ( Intravenous MAR Hold 10/5/24 0450)   nitroglycerin (NITROSTAT) SL tablet 0.4 mg (has no administration in time range)   acetaminophen (TYLENOL) tablet 650 mg (has no administration in time range)   aspirin EC tablet 81 mg (81 mg Oral Given 10/5/24 0809)   ticagrelor (BRILINTA) tablet 90 mg (90 mg Oral Given 10/5/24 2048)   rosuvastatin (CRESTOR) tablet 40 mg (40 mg Oral Given 10/5/24 2048)   sodium chloride 0.9 % flush 10 mL (10 mL Intravenous Given 10/5/24 2048)   sodium chloride 0.9 % flush 10 mL (has no administration in time range)   sodium chloride 0.9 % infusion 40 mL (has no administration in time range)   sennosides-docusate (PERICOLACE) 8.6-50 MG per tablet 2 tablet (has no administration in time range)     And   polyethylene glycol (MIRALAX) packet 17 g (has no administration in time range)     And   bisacodyl (DULCOLAX) EC tablet 5 mg (has no administration in time range)     And   bisacodyl (DULCOLAX) suppository 10 mg (has no administration in time range)   Lidocaine 4 % 1 patch (1 patch Transdermal Not Given 10/5/24 1520)   heparin (porcine) 5000 UNIT/ML injection (5,418 Units Intravenous Given 10/5/24 0418)   aspirin chewable tablet 324 mg (324 mg Oral Given by Other 10/5/24 0413)   HYDROmorphone (DILAUDID) injection 1 mg (1 mg Intravenous Given 10/5/24 0427)   atorvastatin (LIPITOR) tablet (40 mg Oral Given 10/5/24 0424)   cangrelor 50 mg in 250 ml (200 mcg/ml) IV infusion (0.75 mcg/kg/min ×  90.3 kg Intravenous New Bag 10/5/24 7645)   diphenhydrAMINE (BENADRYL) injection 25 mg (25 mg Intravenous Given 10/5/24 5043)   sodium chloride 0.9 % bolus 500 mL (500 mL Intravenous New Bag 10/6/24 0564)        ED Course:    ED Course as of 10/06/24 0736   Sat Oct 05, 2024   0419 On arrival I received the patient's transmitted ECG from EMS at which point I spoke with , who agrees with activation of the Cath Lab for STEMI [JS]   0420 My interpretation of EKG: Sinus rhythm 56 ST elevations seen in 2, 3, aVF and depressions seen in 1 aVL and V2 [JS]      ED Course User Index  [JS] Eduardo Ayers MD       Labs:    Lab Results (last 24 hours)       Procedure Component Value Units Date/Time    Hemoglobin A1c [[id number]] Collected: 10/06/24 0300    Specimen: Blood Updated: 10/06/24 0341    Lipid Panel [[id number]]  (Abnormal) Collected: 10/06/24 0300    Specimen: Blood Updated: 10/06/24 0415     Total Cholesterol 168 mg/dL      Triglycerides 157 mg/dL      HDL Cholesterol 29 mg/dL      LDL Cholesterol  111 mg/dL      VLDL Cholesterol 28 mg/dL      LDL/HDL Ratio 3.71    Narrative:      Cholesterol Reference Ranges  (U.S. Department of Health and Human Services ATP III Classifications)    Desirable          <200 mg/dL  Borderline High    200-239 mg/dL  High Risk          >240 mg/dL      Triglyceride Reference Ranges  (U.S. Department of Health and Human Services ATP III Classifications)    Normal           <150 mg/dL  Borderline High  150-199 mg/dL  High             200-499 mg/dL  Very High        >500 mg/dL    HDL Reference Ranges  (U.S. Department of Health and Human Services ATP III Classifications)    Low     <40 mg/dl (major risk factor for CHD)  High    >60 mg/dl ('negative' risk factor for CHD)        LDL Reference Ranges  (U.S. Department of Health and Human Services ATP III Classifications)    Optimal          <100 mg/dL  Near Optimal     100-129 mg/dL  Borderline High  130-159 mg/dL  High              160-189 mg/dL  Very High        >189 mg/dL    Basic Metabolic Panel [[id number]]  (Abnormal) Collected: 10/06/24 0300    Specimen: Blood Updated: 10/06/24 0415     Glucose 111 mg/dL      BUN 16 mg/dL      Creatinine 0.81 mg/dL      Sodium 134 mmol/L      Potassium 3.7 mmol/L      Chloride 99 mmol/L      CO2 21.4 mmol/L      Calcium 8.6 mg/dL      BUN/Creatinine Ratio 19.8     Anion Gap 13.6 mmol/L      eGFR 98.5 mL/min/1.73     Narrative:      GFR Normal >60  Chronic Kidney Disease <60  Kidney Failure <15      CBC & Differential [[id number]]  (Normal) Collected: 10/06/24 0300    Specimen: Blood Updated: 10/06/24 0346    Narrative:      The following orders were created for panel order CBC & Differential.  Procedure                               Abnormality         Status                     ---------                               -----------         ------                     CBC Auto Differential[[id number]]        Normal              Final result                 Please view results for these tests on the individual orders.    CBC Auto Differential [[id number]]  (Normal) Collected: 10/06/24 0300    Specimen: Blood Updated: 10/06/24 0346     WBC 8.88 10*3/mm3      RBC 4.91 10*6/mm3      Hemoglobin 14.6 g/dL      Hematocrit 43.0 %      MCV 87.6 fL      MCH 29.7 pg      MCHC 34.0 g/dL      RDW 13.5 %      RDW-SD 43.3 fl      MPV 9.8 fL      Platelets 187 10*3/mm3      Neutrophil % 69.9 %      Lymphocyte % 20.4 %      Monocyte % 6.9 %      Eosinophil % 1.8 %      Basophil % 0.5 %      Immature Grans % 0.5 %      Neutrophils, Absolute 6.22 10*3/mm3      Lymphocytes, Absolute 1.81 10*3/mm3      Monocytes, Absolute 0.61 10*3/mm3      Eosinophils, Absolute 0.16 10*3/mm3      Basophils, Absolute 0.04 10*3/mm3      Immature Grans, Absolute 0.04 10*3/mm3      nRBC 0.0 /100 WBC              Imaging:    No Radiology Exams Resulted Within Past 24 Hours      Differential Diagnosis and Discussion:    Chest Pain:  Based on the  patient's signs and symptoms, I considered aortic dissection, myocardial infaction, pulmonary embolism, cardiac tamponade, pericarditis, pneumothorax, musculoskeletal chest pain and other differential diagnosis as an etiology of the patient's chest pain.     All labs were reviewed and interpreted by me.  EKG was interpreted by me.    MDM       Patient was placed on the cardiac monitor after being given IV heparin.  They were monitored for ventricular ectopy, arrhythmia, tachycardia, hypoxia, and changes in blood pressure.  Patient was rechecked several times throughout their stay for mental status decline and for reassessment of worsening changes in vital signs.     Total Critical Care time of 30 minutes. Total critical care time documented does not include time spent on separately billed procedures for services of nurses or physician assistants. I personally saw and examined the patient. I have reviewed all diagnostic interpretations and treatment plans as written. I was present for the key portions of any procedures performed and the inclusive time noted in any critical care statement. Critical care time includes patient management by me, time spent at the patients bedside,  time to review lab and imaging results, discussing patient care, documentation in the medical record, and time spent with family or caregiver.        Patient Care Considerations:          Consultants/Shared Management Plan:    Consultant: I have discussed the case with cardiology who states activate Cath Lab    Social Determinants of Health:    Patient has presented with family members who are responsible, reliable and will ensure follow up care.      Disposition and Care Coordination:    Admit:   Through independent evaluation of the patient's history, physical, and imperical data, the patient meets criteria for inpatient admission to the hospital.        Final diagnoses:   ST elevation myocardial infarction (STEMI), unspecified artery         ED Disposition       ED Disposition   Send to Cath Lab    Condition   --    Comment   --               This medical record created using voice recognition software.             Eduardo Ayers MD  10/06/24 2263

## 2024-10-06 ENCOUNTER — APPOINTMENT (OUTPATIENT)
Dept: CARDIOLOGY | Facility: HOSPITAL | Age: 64
End: 2024-10-06
Payer: MEDICARE

## 2024-10-06 LAB
ANION GAP SERPL CALCULATED.3IONS-SCNC: 13.6 MMOL/L (ref 5–15)
BASOPHILS # BLD AUTO: 0.04 10*3/MM3 (ref 0–0.2)
BASOPHILS NFR BLD AUTO: 0.5 % (ref 0–1.5)
BH CV ECHO MEAS - AO MAX PG: 6.1 MMHG
BH CV ECHO MEAS - AO MEAN PG: 3 MMHG
BH CV ECHO MEAS - AO ROOT DIAM: 3.5 CM
BH CV ECHO MEAS - AO V2 MAX: 123 CM/SEC
BH CV ECHO MEAS - AO V2 VTI: 27.4 CM
BH CV ECHO MEAS - AVA(I,D): 2.06 CM2
BH CV ECHO MEAS - EDV(CUBED): 148.9 ML
BH CV ECHO MEAS - EDV(MOD-SP2): 77.1 ML
BH CV ECHO MEAS - EDV(MOD-SP4): 90.2 ML
BH CV ECHO MEAS - EF(MOD-BP): 55.8 %
BH CV ECHO MEAS - EF(MOD-SP2): 54.6 %
BH CV ECHO MEAS - EF(MOD-SP4): 55 %
BH CV ECHO MEAS - ESV(CUBED): 54.9 ML
BH CV ECHO MEAS - ESV(MOD-SP2): 35 ML
BH CV ECHO MEAS - ESV(MOD-SP4): 40.6 ML
BH CV ECHO MEAS - FS: 28.3 %
BH CV ECHO MEAS - IVS/LVPW: 0.8 CM
BH CV ECHO MEAS - IVSD: 0.8 CM
BH CV ECHO MEAS - LA DIMENSION: 3.3 CM
BH CV ECHO MEAS - LAT PEAK E' VEL: 7.2 CM/SEC
BH CV ECHO MEAS - LV DIASTOLIC VOL/BSA (35-75): 45.8 CM2
BH CV ECHO MEAS - LV MASS(C)D: 174.5 GRAMS
BH CV ECHO MEAS - LV MAX PG: 2.06 MMHG
BH CV ECHO MEAS - LV MEAN PG: 1 MMHG
BH CV ECHO MEAS - LV SYSTOLIC VOL/BSA (12-30): 20.6 CM2
BH CV ECHO MEAS - LV V1 MAX: 71.8 CM/SEC
BH CV ECHO MEAS - LV V1 VTI: 18 CM
BH CV ECHO MEAS - LVIDD: 5.3 CM
BH CV ECHO MEAS - LVIDS: 3.8 CM
BH CV ECHO MEAS - LVOT AREA: 3.1 CM2
BH CV ECHO MEAS - LVOT DIAM: 2 CM
BH CV ECHO MEAS - LVPWD: 1 CM
BH CV ECHO MEAS - MED PEAK E' VEL: 7.6 CM/SEC
BH CV ECHO MEAS - MV A MAX VEL: 71.3 CM/SEC
BH CV ECHO MEAS - MV DEC SLOPE: 532 CM/SEC2
BH CV ECHO MEAS - MV E MAX VEL: 90.7 CM/SEC
BH CV ECHO MEAS - MV E/A: 1.27
BH CV ECHO MEAS - MV MEAN PG: 1 MMHG
BH CV ECHO MEAS - MV P1/2T: 50.2 MSEC
BH CV ECHO MEAS - MV V2 VTI: 34.5 CM
BH CV ECHO MEAS - MVA(P1/2T): 4.4 CM2
BH CV ECHO MEAS - MVA(VTI): 1.64 CM2
BH CV ECHO MEAS - RVDD: 2.9 CM
BH CV ECHO MEAS - SV(LVOT): 56.5 ML
BH CV ECHO MEAS - SV(MOD-SP2): 42.1 ML
BH CV ECHO MEAS - SV(MOD-SP4): 49.6 ML
BH CV ECHO MEAS - SVI(LVOT): 28.7 ML/M2
BH CV ECHO MEAS - SVI(MOD-SP2): 21.4 ML/M2
BH CV ECHO MEAS - SVI(MOD-SP4): 25.2 ML/M2
BH CV ECHO MEAS - TAPSE (>1.6): 2.38 CM
BH CV ECHO MEASUREMENTS AVERAGE E/E' RATIO: 12.26
BUN SERPL-MCNC: 16 MG/DL (ref 8–23)
BUN/CREAT SERPL: 19.8 (ref 7–25)
CALCIUM SPEC-SCNC: 8.6 MG/DL (ref 8.6–10.5)
CHLORIDE SERPL-SCNC: 99 MMOL/L (ref 98–107)
CHOLEST SERPL-MCNC: 168 MG/DL (ref 0–200)
CO2 SERPL-SCNC: 21.4 MMOL/L (ref 22–29)
CREAT SERPL-MCNC: 0.81 MG/DL (ref 0.76–1.27)
DEPRECATED RDW RBC AUTO: 43.3 FL (ref 37–54)
EGFRCR SERPLBLD CKD-EPI 2021: 98.5 ML/MIN/1.73
EOSINOPHIL # BLD AUTO: 0.16 10*3/MM3 (ref 0–0.4)
EOSINOPHIL NFR BLD AUTO: 1.8 % (ref 0.3–6.2)
ERYTHROCYTE [DISTWIDTH] IN BLOOD BY AUTOMATED COUNT: 13.5 % (ref 12.3–15.4)
GLUCOSE SERPL-MCNC: 111 MG/DL (ref 65–99)
HBA1C MFR BLD: 5.7 % (ref 4.8–5.6)
HCT VFR BLD AUTO: 43 % (ref 37.5–51)
HDLC SERPL-MCNC: 29 MG/DL (ref 40–60)
HGB BLD-MCNC: 14.6 G/DL (ref 13–17.7)
IMM GRANULOCYTES # BLD AUTO: 0.04 10*3/MM3 (ref 0–0.05)
IMM GRANULOCYTES NFR BLD AUTO: 0.5 % (ref 0–0.5)
LDLC SERPL CALC-MCNC: 111 MG/DL (ref 0–100)
LDLC/HDLC SERPL: 3.71 {RATIO}
LEFT ATRIUM VOLUME INDEX: 14.1 ML/M2
LYMPHOCYTES # BLD AUTO: 1.81 10*3/MM3 (ref 0.7–3.1)
LYMPHOCYTES NFR BLD AUTO: 20.4 % (ref 19.6–45.3)
MCH RBC QN AUTO: 29.7 PG (ref 26.6–33)
MCHC RBC AUTO-ENTMCNC: 34 G/DL (ref 31.5–35.7)
MCV RBC AUTO: 87.6 FL (ref 79–97)
MONOCYTES # BLD AUTO: 0.61 10*3/MM3 (ref 0.1–0.9)
MONOCYTES NFR BLD AUTO: 6.9 % (ref 5–12)
NEUTROPHILS NFR BLD AUTO: 6.22 10*3/MM3 (ref 1.7–7)
NEUTROPHILS NFR BLD AUTO: 69.9 % (ref 42.7–76)
NRBC BLD AUTO-RTO: 0 /100 WBC (ref 0–0.2)
PLATELET # BLD AUTO: 187 10*3/MM3 (ref 140–450)
PMV BLD AUTO: 9.8 FL (ref 6–12)
POTASSIUM SERPL-SCNC: 3.7 MMOL/L (ref 3.5–5.2)
QT INTERVAL: 455 MS
QTC INTERVAL: 450 MS
RBC # BLD AUTO: 4.91 10*6/MM3 (ref 4.14–5.8)
SODIUM SERPL-SCNC: 134 MMOL/L (ref 136–145)
TRIGL SERPL-MCNC: 157 MG/DL (ref 0–150)
VLDLC SERPL-MCNC: 28 MG/DL (ref 5–40)
WBC NRBC COR # BLD AUTO: 8.88 10*3/MM3 (ref 3.4–10.8)

## 2024-10-06 PROCEDURE — 99232 SBSQ HOSP IP/OBS MODERATE 35: CPT | Performed by: INTERNAL MEDICINE

## 2024-10-06 PROCEDURE — 93005 ELECTROCARDIOGRAM TRACING: CPT | Performed by: STUDENT IN AN ORGANIZED HEALTH CARE EDUCATION/TRAINING PROGRAM

## 2024-10-06 PROCEDURE — 80048 BASIC METABOLIC PNL TOTAL CA: CPT | Performed by: STUDENT IN AN ORGANIZED HEALTH CARE EDUCATION/TRAINING PROGRAM

## 2024-10-06 PROCEDURE — 93306 TTE W/DOPPLER COMPLETE: CPT

## 2024-10-06 PROCEDURE — 80061 LIPID PANEL: CPT | Performed by: STUDENT IN AN ORGANIZED HEALTH CARE EDUCATION/TRAINING PROGRAM

## 2024-10-06 PROCEDURE — 93306 TTE W/DOPPLER COMPLETE: CPT | Performed by: STUDENT IN AN ORGANIZED HEALTH CARE EDUCATION/TRAINING PROGRAM

## 2024-10-06 PROCEDURE — 25810000003 SODIUM CHLORIDE 0.9 % SOLUTION: Performed by: INTERNAL MEDICINE

## 2024-10-06 PROCEDURE — 85025 COMPLETE CBC W/AUTO DIFF WBC: CPT | Performed by: STUDENT IN AN ORGANIZED HEALTH CARE EDUCATION/TRAINING PROGRAM

## 2024-10-06 PROCEDURE — 83036 HEMOGLOBIN GLYCOSYLATED A1C: CPT | Performed by: STUDENT IN AN ORGANIZED HEALTH CARE EDUCATION/TRAINING PROGRAM

## 2024-10-06 RX ORDER — METOPROLOL SUCCINATE 25 MG/1
25 TABLET, EXTENDED RELEASE ORAL
Status: DISCONTINUED | OUTPATIENT
Start: 2024-10-06 | End: 2024-10-06

## 2024-10-06 RX ORDER — METOPROLOL SUCCINATE 25 MG/1
12.5 TABLET, EXTENDED RELEASE ORAL
Status: DISCONTINUED | OUTPATIENT
Start: 2024-10-06 | End: 2024-10-07 | Stop reason: HOSPADM

## 2024-10-06 RX ADMIN — SODIUM CHLORIDE 500 ML: 9 INJECTION, SOLUTION INTRAVENOUS at 02:54

## 2024-10-06 RX ADMIN — ROSUVASTATIN 40 MG: 20 TABLET, FILM COATED ORAL at 20:16

## 2024-10-06 RX ADMIN — METOPROLOL SUCCINATE 12.5 MG: 25 TABLET, EXTENDED RELEASE ORAL at 08:57

## 2024-10-06 RX ADMIN — Medication 10 ML: at 08:15

## 2024-10-06 RX ADMIN — TICAGRELOR 90 MG: 90 TABLET ORAL at 08:15

## 2024-10-06 RX ADMIN — ASPIRIN 81 MG: 81 TABLET, COATED ORAL at 08:14

## 2024-10-06 RX ADMIN — TICAGRELOR 90 MG: 90 TABLET ORAL at 20:15

## 2024-10-06 RX ADMIN — Medication 10 ML: at 20:16

## 2024-10-06 NOTE — PROGRESS NOTES
Pulmonary / Critical Care Progress Note      Patient Name: Jaren Benedict  : 1960  MRN: [id number]  Attending:  Keshav Marcano MD  Date of admission: 10/5/2024    Subjective   Subjective   Follow-up for STEMI    Over past 24 hours:  On room air  Doing well  No chest pain  No respiratory complaints        Objective   Objective     Vitals:   Temp:  [97.4 °F (36.3 °C)-98.6 °F (37 °C)] 98.6 °F (37 °C)  Heart Rate:  [51-82] 56  Resp:  [10-16] 14  BP: ()/() 126/113    Physical Exam   Vital Signs Reviewed   General:  WDWN, Alert, NAD.    HEENT:  PERRL, EOMI.  OP, nares clear  Chest:  good aeration, clear to auscultation bilaterally, tympanic to percussion bilaterally, no work of breathing noted  CV: RRR, no MGR, pulses 2+, equal.  Abd:  Soft, NT, ND, + BS, no HSM  EXT:  no clubbing, no cyanosis, no edema  Neuro:  A&Ox3, CN grossly intact, no focal deficits.  Skin: No rashes or lesions noted      Result Review    Result Review:  I have personally reviewed the results from the time of this admission to 10/6/2024 06:52 EDT and agree with these findings:  [x]  Laboratory  []  Microbiology  [x]  Radiology  [x]  EKG/Telemetry   []  Cardiology/Vascular   []  Pathology  []  Old records  []  Other:  Most notable findings include:       Lab 10/06/24  0300 10/05/24  0653 10/05/24  0420   WBC 8.88  --  9.71   HEMOGLOBIN 14.6  --  16.2   HEMATOCRIT 43.0  --  47.4   PLATELETS 187  --  209   SODIUM 134* 130* 136   POTASSIUM 3.7 4.1 4.1   CHLORIDE 99 99 99   CO2 21.4* 23.2 23.2   BUN 16 14 13   CREATININE 0.81 0.79 0.87   GLUCOSE 111* 137* 184*   CALCIUM 8.6 8.9 9.2   TOTAL PROTEIN  --   --  7.3   ALBUMIN  --   --  4.1   GLOBULIN  --   --  3.2     Chest x-ray with clear lung fields  Left heart cath note reviewed    Assessment & Plan   Assessment / Plan     Active Hospital Problems:  Active Hospital Problems    Diagnosis     **STEMI (ST elevation myocardial infarction)      Impression:  Inferior wall STEMI status post PCI  to RCA  Hyponatremia, clinically insignificant  Hyperglycemia  Hypokalemia  Tobacco abuse of cigarettes     Plan  Status post PCI to RCA  Continue dual antiplatelet therapy for now  Continue statin  A1c 5.7.    Triglycerides 157.  HDL 29.    Will need cardiac rehab at discharge  Diet as tolerated  Encourage activity and incentive spirometer use  Trend renal panel electrolytes.  Replace potassium orally  Smoking cessation encouraged.  Has nicotine patch available    Okay to transfer out of ICU    VTE Prophylaxis:  Mechanical VTE prophylaxis orders are present.    CODE STATUS:   Level Of Support Discussed With: Patient  Code Status (Patient has no pulse and is not breathing): CPR (Attempt to Resuscitate)  Medical Interventions (Patient has pulse or is breathing): Full Support      Labs, imaging, microbiology, notes and medications personally reviewed  Discussed with primary    Electronically signed by Timo Ferguson MD, 10/06/24, 3:52 PM EDT.

## 2024-10-06 NOTE — PLAN OF CARE
Goal Outcome Evaluation:  Plan of Care Reviewed With: patient        Progress: improving  Outcome Evaluation: Pt is A&Ox4, no c/o chest pain. BP initially soft, bolus given, now WNL.

## 2024-10-06 NOTE — PROGRESS NOTES
CARDIOLOY  INPATIENT PROGRESS NOTE         [facility] 4TH FLOOR MEDICAL TELEMETRY UNIT    10/6/2024      PATIENT IDENTIFICATION:   Name:  Jaren Benedict      MRN:  [id number]     63 y.o.  male                 SUBJECTIVE:   Doing well, denies chest pain, tolerating p.o. well  OBJECTIVE:  Vitals:    10/06/24 0715 10/06/24 0748 10/06/24 0857 10/06/24 1125   BP:    109/72   BP Location:    Left arm   Patient Position:    Lying   Pulse: 58  72 67   Resp:    16   Temp:  98.1 °F (36.7 °C)  97.9 °F (36.6 °C)   TempSrc:  Oral  Oral   SpO2: 95%   97%   Weight:       Height:               Body mass index is 29.49 kg/m².    Intake/Output Summary (Last 24 hours) at 10/6/2024 1532  Last data filed at 10/6/2024 0700  Gross per 24 hour   Intake 505 ml   Output 1000 ml   Net -495 ml       Telemetry: Intermittent PACs and PVCs.  Otherwise normal sinus rhythm    Physical Exam  Constitutional:  Awake. Not in acute distress. Normal appearance.   Neck: No carotid bruit, hepatojugular reflux or JVD.   Cardiovascular:      Rate and Rhythm: Normal rate and regular rhythm.      Chest Wall: PMI is not displaced.      Heart sounds: Normal heart sounds, S1 normal and S2 normal. No murmur heard.         Pulmonary: Pulmonary effort is normal. Normal breath sounds. No wheezing, rhonchi or rales.   Extremities: No Bilateral edema is noted.   Skin: Warm and dry. Non cyanotic, No petechiae or rash.   Neurological: Alert and oriented x 3        No Known Allergies  Scheduled meds:  aspirin, 81 mg, Oral, Daily  Lidocaine, 1 patch, Transdermal, Q24H  metoprolol succinate XL, 12.5 mg, Oral, Q24H  rosuvastatin, 40 mg, Oral, Nightly  sodium chloride, 10 mL, Intravenous, Q12H  ticagrelor, 90 mg, Oral, BID      IV meds:                         Data Review:  CBC          10/5/2024    04:20 10/6/2024    03:00   CBC   WBC 9.71  8.88    RBC 5.44  4.91    Hemoglobin 16.2  14.6    Hematocrit 47.4  43.0    MCV 87.1  87.6    MCH 29.8  29.7    MCHC 34.2  34.0  "   RDW 13.3  13.5    Platelets 209  187      CMP          10/5/2024    04:20 10/5/2024    06:53 10/6/2024    03:00   CMP   Glucose 184  137  111    BUN 13  14  16    Creatinine 0.87  0.79  0.81    EGFR 96.4  99.2  98.5    Sodium 136  130  134    Potassium 4.1  4.1  3.7    Chloride 99  99  99    Calcium 9.2  8.9  8.6    Total Protein 7.3      Albumin 4.1      Globulin 3.2      Total Bilirubin 0.7      Alkaline Phosphatase 92      AST (SGOT) 24      ALT (SGPT) 28      Albumin/Globulin Ratio 1.3      BUN/Creatinine Ratio 14.9  17.7  19.8    Anion Gap 13.8  7.8  13.6       CARDIAC LABS:      Lab 10/05/24  0653 10/05/24  0420   PROBNP  --  70.5   HSTROP T 338* 31*        No results found for: \"DIGOXIN\"   No results found for: \"TSH\"  Results from last 7 days   Lab Units 10/06/24  0300   CHOLESTEROL mg/dL 168   HDL CHOL mg/dL 29*     No results found for: \"POCTROP\"  Lab Results   Component Value Date    TROPONINT 338 (C) 10/05/2024   (  Lab Results   Component Value Date    MG 2.0 10/05/2024              ASSESSMENT:  Inferior wall ST elevation MI  Tobacco use disorder  Overweight     Plan:  Patient was taken to Cath Lab, his coronary angiogram showed proximal RCA segment 100% acute thrombotic occlusion which was successfully revascularized with IVUS guided 3 x 48 mm drug-eluting stent optimized with 3.5 x 15 mm balloon to achieve KARISSA-3 flow with 0% residual stenosis.     Continue aspirin 81 mg p.o. daily along with Brilinta 90 mg p.o. twice daily  Patient does not have diabetes, baseline LDL is 111 and triglycerides are 157.Goal is to achieve greater than 40% reduction in LDL and triglycerides in 6 to 8 weeks.  Continue Crestor 40 mg p.o. daily  Continue to monitor on telemetry for ventricular arrhythmias.  Be transferred outside of CCU today.  Staged PCI of LAD tomorrow     Thank you for allowing us to share in [facility]. Please call with any questions or concerns.         I have seen and examined the patient. I " spent 30 mins caring for the patient on this date of service. This time includes time spent by me in the following activities as necessary: face to face encounter, preparing for the visit, reviewing tests, specialists records and previous visits, obtaining and/or reviewing a separately obtained history, performing a medically appropriate exam and/or evaluation, counseling and educating the patient, family, caregiver, referring and/or communicating with other healthcare professionals, documenting information in the medical record, independently interpreting results and communicating that information with the patient, family, caregiver, and developing a medically appropriate treatment plan with consideration of other conditions, medications, and treatments. More than 50 % time was spent in counselling and patient education.             Keshav Marcano MD  10/6/2024    15:32 EDT

## 2024-10-06 NOTE — PLAN OF CARE
Goal Outcome Evaluation:  Plan of Care Reviewed With: patient        Progress: improving  Outcome Evaluation: Patient is alert and oriented on room air. No complaints of pain this shift. Plan to be NPO at midnight for another cardiac cath in the AM. Had echo completed this shift. Will continue with POC.

## 2024-10-07 ENCOUNTER — READMISSION MANAGEMENT (OUTPATIENT)
Dept: CALL CENTER | Facility: HOSPITAL | Age: 64
End: 2024-10-07
Payer: MEDICARE

## 2024-10-07 VITALS
HEIGHT: 67 IN | TEMPERATURE: 97.3 F | OXYGEN SATURATION: 97 % | SYSTOLIC BLOOD PRESSURE: 120 MMHG | WEIGHT: 188.27 LBS | DIASTOLIC BLOOD PRESSURE: 75 MMHG | HEART RATE: 59 BPM | BODY MASS INDEX: 29.55 KG/M2 | RESPIRATION RATE: 18 BRPM

## 2024-10-07 PROBLEM — I21.3 STEMI (ST ELEVATION MYOCARDIAL INFARCTION): Status: RESOLVED | Noted: 2024-10-05 | Resolved: 2024-10-07

## 2024-10-07 LAB
ANION GAP SERPL CALCULATED.3IONS-SCNC: 13.8 MMOL/L (ref 5–15)
BASOPHILS # BLD AUTO: 0.05 10*3/MM3 (ref 0–0.2)
BASOPHILS NFR BLD AUTO: 0.7 % (ref 0–1.5)
BUN SERPL-MCNC: 18 MG/DL (ref 8–23)
BUN/CREAT SERPL: 21.2 (ref 7–25)
CALCIUM SPEC-SCNC: 8.8 MG/DL (ref 8.6–10.5)
CHLORIDE SERPL-SCNC: 101 MMOL/L (ref 98–107)
CO2 SERPL-SCNC: 21.2 MMOL/L (ref 22–29)
CREAT SERPL-MCNC: 0.85 MG/DL (ref 0.76–1.27)
DEPRECATED RDW RBC AUTO: 42.8 FL (ref 37–54)
EGFRCR SERPLBLD CKD-EPI 2021: 96.4 ML/MIN/1.73
EOSINOPHIL # BLD AUTO: 0.2 10*3/MM3 (ref 0–0.4)
EOSINOPHIL NFR BLD AUTO: 2.6 % (ref 0.3–6.2)
ERYTHROCYTE [DISTWIDTH] IN BLOOD BY AUTOMATED COUNT: 13.2 % (ref 12.3–15.4)
GLUCOSE SERPL-MCNC: 115 MG/DL (ref 65–99)
HCT VFR BLD AUTO: 43.1 % (ref 37.5–51)
HGB BLD-MCNC: 14.7 G/DL (ref 13–17.7)
IMM GRANULOCYTES # BLD AUTO: 0.03 10*3/MM3 (ref 0–0.05)
IMM GRANULOCYTES NFR BLD AUTO: 0.4 % (ref 0–0.5)
LYMPHOCYTES # BLD AUTO: 1.56 10*3/MM3 (ref 0.7–3.1)
LYMPHOCYTES NFR BLD AUTO: 20.5 % (ref 19.6–45.3)
MCH RBC QN AUTO: 30.1 PG (ref 26.6–33)
MCHC RBC AUTO-ENTMCNC: 34.1 G/DL (ref 31.5–35.7)
MCV RBC AUTO: 88.1 FL (ref 79–97)
MONOCYTES # BLD AUTO: 0.55 10*3/MM3 (ref 0.1–0.9)
MONOCYTES NFR BLD AUTO: 7.2 % (ref 5–12)
NEUTROPHILS NFR BLD AUTO: 5.22 10*3/MM3 (ref 1.7–7)
NEUTROPHILS NFR BLD AUTO: 68.6 % (ref 42.7–76)
NRBC BLD AUTO-RTO: 0 /100 WBC (ref 0–0.2)
PLATELET # BLD AUTO: 184 10*3/MM3 (ref 140–450)
PMV BLD AUTO: 9.8 FL (ref 6–12)
POTASSIUM SERPL-SCNC: 4 MMOL/L (ref 3.5–5.2)
RBC # BLD AUTO: 4.89 10*6/MM3 (ref 4.14–5.8)
SODIUM SERPL-SCNC: 136 MMOL/L (ref 136–145)
WBC NRBC COR # BLD AUTO: 7.61 10*3/MM3 (ref 3.4–10.8)

## 2024-10-07 PROCEDURE — 99232 SBSQ HOSP IP/OBS MODERATE 35: CPT | Performed by: INTERNAL MEDICINE

## 2024-10-07 PROCEDURE — G0008 ADMIN INFLUENZA VIRUS VAC: HCPCS | Performed by: STUDENT IN AN ORGANIZED HEALTH CARE EDUCATION/TRAINING PROGRAM

## 2024-10-07 PROCEDURE — 25010000002 INFLUENZA VIRUS VACC SPLIT PF 0.5 ML SUSPENSION PREFILLED SYRINGE: Performed by: STUDENT IN AN ORGANIZED HEALTH CARE EDUCATION/TRAINING PROGRAM

## 2024-10-07 PROCEDURE — 25810000003 SODIUM CHLORIDE 0.9 % SOLUTION: Performed by: STUDENT IN AN ORGANIZED HEALTH CARE EDUCATION/TRAINING PROGRAM

## 2024-10-07 PROCEDURE — 85025 COMPLETE CBC W/AUTO DIFF WBC: CPT | Performed by: STUDENT IN AN ORGANIZED HEALTH CARE EDUCATION/TRAINING PROGRAM

## 2024-10-07 PROCEDURE — 90656 IIV3 VACC NO PRSV 0.5 ML IM: CPT | Performed by: STUDENT IN AN ORGANIZED HEALTH CARE EDUCATION/TRAINING PROGRAM

## 2024-10-07 PROCEDURE — 99238 HOSP IP/OBS DSCHRG MGMT 30/<: CPT | Performed by: STUDENT IN AN ORGANIZED HEALTH CARE EDUCATION/TRAINING PROGRAM

## 2024-10-07 PROCEDURE — 80048 BASIC METABOLIC PNL TOTAL CA: CPT | Performed by: STUDENT IN AN ORGANIZED HEALTH CARE EDUCATION/TRAINING PROGRAM

## 2024-10-07 RX ORDER — METOPROLOL SUCCINATE 25 MG/1
12.5 TABLET, EXTENDED RELEASE ORAL
Qty: 60 TABLET | Refills: 5 | Status: SHIPPED | OUTPATIENT
Start: 2024-10-08

## 2024-10-07 RX ORDER — ASPIRIN 81 MG/1
81 TABLET ORAL DAILY
Qty: 60 TABLET | Refills: 5 | Status: SHIPPED | OUTPATIENT
Start: 2024-10-08

## 2024-10-07 RX ORDER — ROSUVASTATIN CALCIUM 40 MG/1
40 TABLET, COATED ORAL NIGHTLY
Qty: 90 TABLET | Refills: 3 | Status: SHIPPED | OUTPATIENT
Start: 2024-10-07

## 2024-10-07 RX ORDER — NITROGLYCERIN 0.4 MG/1
0.4 TABLET SUBLINGUAL
Qty: 100 TABLET | Refills: 1 | Status: SHIPPED | OUTPATIENT
Start: 2024-10-07

## 2024-10-07 RX ORDER — SODIUM CHLORIDE 9 MG/ML
100 INJECTION, SOLUTION INTRAVENOUS CONTINUOUS
Status: ACTIVE | OUTPATIENT
Start: 2024-10-08 | End: 2024-10-07

## 2024-10-07 RX ADMIN — SODIUM CHLORIDE 100 ML/HR: 9 INJECTION, SOLUTION INTRAVENOUS at 05:56

## 2024-10-07 RX ADMIN — INFLUENZA A VIRUS A/VICTORIA/4897/2022 IVR-238 (H1N1) ANTIGEN (FORMALDEHYDE INACTIVATED), INFLUENZA A VIRUS A/CALIFORNIA/122/2022 SAN-022 (H3N2) ANTIGEN (FORMALDEHYDE INACTIVATED), AND INFLUENZA B VIRUS B/MICHIGAN/01/2021 ANTIGEN (FORMALDEHYDE INACTIVATED) 0.5 ML: 15; 15; 15 INJECTION, SUSPENSION INTRAMUSCULAR at 15:15

## 2024-10-07 RX ADMIN — SODIUM CHLORIDE 100 ML/HR: 9 INJECTION, SOLUTION INTRAVENOUS at 05:58

## 2024-10-07 RX ADMIN — TICAGRELOR 90 MG: 90 TABLET ORAL at 10:26

## 2024-10-07 RX ADMIN — METOPROLOL SUCCINATE 12.5 MG: 25 TABLET, EXTENDED RELEASE ORAL at 10:27

## 2024-10-07 RX ADMIN — ASPIRIN 81 MG: 81 TABLET, COATED ORAL at 10:26

## 2024-10-07 NOTE — PLAN OF CARE
Goal Outcome Evaluation:      Patient was able to get some rest tonight. Expecting to go for a repeat heart cath today. Patient has been NPO since midnight. Continue plan of care.

## 2024-10-07 NOTE — PROGRESS NOTES
Pulmonary / Critical Care Progress Note      Patient Name: Jaren Benedict  : 1960  MRN: [id number]  Attending:  Keshav Marcano MD  Date of admission: 10/5/2024    Subjective   Subjective   Follow-up for STEMI.    Over past 24 hours: Underwent cardiac cath with PCI to RCA.    No acute events overnight.    This morning,  Lying in bed on room air  Denies any chest pain  N.p.o. for repeat cath today  No fever chills  Overall doing well    Objective   Objective     Vitals:   Temp:  [97.2 °F (36.2 °C)-98.2 °F (36.8 °C)] 98.1 °F (36.7 °C)  Heart Rate:  [56-72] 56  Resp:  [16-18] 18  BP: ()/(66-80) 103/71    Physical Exam   Vital Signs Reviewed   General:  WDWN, Alert, NAD on room air  HEENT:  PERRL, EOMI.  OP, nares clear  Chest:  good aeration, clear to auscultation bilaterally, tympanic to percussion bilaterally, no work of breathing noted  CV: RRR, no MGR, pulses 2+, equal  Abd:  Soft, NT, ND, + BS, no HSM  EXT:  no clubbing, no cyanosis, no edema  Neuro:  A&Ox3, CN grossly intact, no focal deficits  Skin: No rashes or lesions noted    Result Review    Result Review:  I have personally reviewed the results from the time of this admission to 10/7/2024 07:25 EDT and agree with these findings:  [x]  Laboratory  []  Microbiology  [x]  Radiology  [x]  EKG/Telemetry   []  Cardiology/Vascular   []  Pathology  []  Old records  []  Other:  Most notable findings include:       Lab 10/07/24  0523 10/06/24  0300 10/05/24  0653 10/05/24  0420   WBC 7.61 8.88  --  9.71   HEMOGLOBIN 14.7 14.6  --  16.2   HEMATOCRIT 43.1 43.0  --  47.4   PLATELETS 184 187  --  209   SODIUM 136 134* 130* 136   POTASSIUM 4.0 3.7 4.1 4.1   CHLORIDE 101 99 99 99   CO2 21.2* 21.4* 23.2 23.2   BUN 18 16 14 13   CREATININE 0.85 0.81 0.79 0.87   GLUCOSE 115* 111* 137* 184*   CALCIUM 8.8 8.6 8.9 9.2   TOTAL PROTEIN  --   --   --  7.3   ALBUMIN  --   --   --  4.1   GLOBULIN  --   --   --  3.2     Chest x-ray with clear lung fields  Left heart cath  note reviewed    Assessment & Plan   Assessment / Plan     Active Hospital Problems:  Active Hospital Problems    Diagnosis     **STEMI (ST elevation myocardial infarction)      Impression:  Inferior wall STEMI status post PCI to RCA  Hyponatremia, clinically insignificant  Hyperglycemia  Hypokalemia  Tobacco abuse of cigarettes     Plan  -On room air.  -Cardiology on board appreciate assistance.  S/p PCI to RCA.  -Continue Brilinta and aspirin.  -Continue metoprolol.  -Continue statin.  -A1c 5.7.    -Triglycerides 157.  HDL 29.    -Will need cardiac rehab at discharge  -Encourage activity and incentive spirometer use  -Smoking cessation encouraged.  Has nicotine patch available but refuses.    Unless otherwise needed, pulmonology will sign off at this time.  Please call with any questions.    VTE Prophylaxis:  Mechanical VTE prophylaxis orders are present.    CODE STATUS:   Level Of Support Discussed With: Patient  Code Status (Patient has no pulse and is not breathing): CPR (Attempt to Resuscitate)  Medical Interventions (Patient has pulse or is breathing): Full Support    I have reviewed labs, imaging, pertinent clinical data and provider notes.   I have discussed with bedside nurse and primary service.     Electronically signed by SARBJIT Ortega, 10/07/24, 10:47 AM EDT.    Electronically signed by Alonso Storey MD, 10/07/24, 7:25 AM EDT.    This visit was performed by BOTH a physician and an APC. I personally evaluated and examined the patient. I performed all aspects of MDM as documented. , I have reviewed and confirmed the accuracy of the patient's history as documented in this note., and I have reexamined the patient and the results are consistent with the previously documented exam. I have updated the documentation as necessary.     Electronically signed by Alonso Storey MD, 10/07/24, 5:28 PM EDT.

## 2024-10-07 NOTE — PLAN OF CARE
Goal Outcome Evaluation:  Plan of Care Reviewed With: patient           Outcome Evaluation: Patient discharged home with orders to follow-up outpatient.  Patient was sent home with medications from pharmacy.  Education provide by staff nursing on discharge medication with verbalized understanding.

## 2024-10-07 NOTE — OUTREACH NOTE
Prep Survey      Flowsheet Row Responses   [facility] facility patient discharged from? [facility]   Is LACE score < 7 ? Yes   Eligibility Not Eligible   What are the reasons patient is not eligible? Other  [low risk for readmit]   Does the patient have one of the following disease processes/diagnoses(primary or secondary)? Other   Prep survey completed? Yes            Cate COLEMAN - Registered Nurse

## 2024-10-08 NOTE — DISCHARGE SUMMARY
[facility]         CARDIOLOGY DISCHARGE SUMMARY    Patient Name: Jaren Benedict  : 1960  MRN: [id number]    Date of Admission: 10/5/2024  Date of Discharge:  10/7/2024  Primary Care Physician: Megan Alvarado APRN    Consults       No orders found from 2024 to 10/6/2024.            Presenting Problem:   ST elevation myocardial infarction (STEMI), unspecified artery [I21.3]  STEMI (ST elevation myocardial infarction) [I21.3]    Active and Resolved Hospital Problems:  Active Hospital Problems   No active problems to display.      Resolved Hospital Problems    Diagnosis POA    **STEMI (ST elevation myocardial infarction) [I21.3] Yes         Hospital Course     Hospital Course:  Jaren Benedict is a 64 y.o. male with past medical history of hypertension presented to the ER with a complaint of chest pain.  Initial workup was remarkable for ST elevation MI of the inferior wall.  He was emergently taken to Cath Lab.  Coronary angiogram revealed RCA proximal segment 100% acute thrombotic occlusion which was identified as a culprit lesion.  It was successfully revascularized with 3 x 48 mm drug-eluting stent, KARISSA-3 flow and 0% residual stenosis was achieved.  Patient was then monitored in the hospital for next 48 hours for any MI related complications.  His hospital stay was uneventful.  His echocardiogram revealed preserved LVEF.    Staged PCI of LAD was discussed with the patient however, since patient lives in [place] and instead wanted to have it done in [place].  He is established with Dr Will Grant who is his primary cardiologist.  Patient is required to continue a year of DAPT uninterrupted along with the cardiac rehab.  His discharge medications are listed below.  Patient made uneventful recovery and is stable for discharge.      DISCHARGE Follow Up Recommendations for labs and diagnostics:       Day of Discharge     Vital Signs:     Physical Exam:      Pertinent  and/or Most  Recent Results     LAB RESULTS:      Lab 10/07/24  0523 10/06/24  0300 10/05/24  0420   WBC 7.61 8.88 9.71   HEMOGLOBIN 14.7 14.6 16.2   HEMATOCRIT 43.1 43.0 47.4   PLATELETS 184 187 209   NEUTROS ABS 5.22 6.22 6.61   IMMATURE GRANS (ABS) 0.03 0.04 0.05   LYMPHS ABS 1.56 1.81 1.99   MONOS ABS 0.55 0.61 0.63   EOS ABS 0.20 0.16 0.35   MCV 88.1 87.6 87.1         Lab 10/07/24  0523 10/06/24  0300 10/05/24  0653 10/05/24  0420   SODIUM 136 134* 130* 136   POTASSIUM 4.0 3.7 4.1 4.1   CHLORIDE 101 99 99 99   CO2 21.2* 21.4* 23.2 23.2   ANION GAP 13.8 13.6 7.8 13.8   BUN 18 16 14 13   CREATININE 0.85 0.81 0.79 0.87   EGFR 96.4 98.5 99.2 96.4   GLUCOSE 115* 111* 137* 184*   CALCIUM 8.8 8.6 8.9 9.2   MAGNESIUM  --   --   --  2.0   HEMOGLOBIN A1C  --  5.70*  --   --          Lab 10/05/24  0420   TOTAL PROTEIN 7.3   ALBUMIN 4.1   GLOBULIN 3.2   ALT (SGPT) 28   AST (SGOT) 24   BILIRUBIN 0.7   ALK PHOS 92   LIPASE 42         Lab 10/05/24  0653 10/05/24  0420   PROBNP  --  70.5   HSTROP T 338* 31*         Lab 10/06/24  0300   CHOLESTEROL 168   LDL CHOL 111*   HDL CHOL 29*   TRIGLYCERIDES 157*             Brief Urine Lab Results       None          Microbiology Results (last 10 days)       ** No results found for the last 240 hours. **            XR Chest 1 View    Result Date: 10/5/2024  Impression: No acute cardiopulmonary disease is seen radiographically. Borderline cardiac enlargement is noted.    Portions of this note were completed with a voice recognition program.   Electronically Signed By-Shimon Sheridan MD On:10/5/2024 4:30 AM               Results for orders placed during the hospital encounter of 10/05/24    Adult Transthoracic Echo Complete W/ Cont if Necessary Per Protocol    Interpretation Summary  Normal chamber sizes.  LV has normal wall thickness.  Some of the inferior wall segments are still hypokinetic whereas rest of the segments have normal wall motion.  Systolic function is normal calculated LVEF is greater  than 55%.  Diastolic function is normal.  RV has normal systolic function.  Trileaflet aortic valve.  There is no aortic valve stenosis/regurgitation.  Structurally normal mitral valve.  There is no significant MR  Grossly normal tricuspid valve.  TR is noted.  RVSP cannot be estimated due to insufficient TR jet no pericardial effusion is noted.  Aortic root has normal dimensions.  IVC is normal size.  Estimated right atrial pressure is 0-5 mm Hg    No prior studies available for comparison      Labs Pending at Discharge:        Discharge Details        Discharge Medications        New Medications        Instructions Start Date   aspirin 81 MG EC tablet  Replaces: aspirin 325 MG tablet   81 mg, Oral, Daily      Brilinta 90 MG tablet tablet  Generic drug: ticagrelor   90 mg, Oral, 2 Times Daily      metoprolol succinate XL 25 MG 24 hr tablet  Commonly known as: TOPROL-XL   12.5 mg, Oral, Every 24 Hours Scheduled      nitroglycerin 0.4 MG SL tablet  Commonly known as: NITROSTAT   0.4 mg, Sublingual, Every 5 Minutes PRN, Take no more than 3 doses in 15 minutes.      rosuvastatin 40 MG tablet  Commonly known as: CRESTOR   40 mg, Oral, Nightly             Stop These Medications      aspirin 325 MG tablet  Replaced by: aspirin 81 MG EC tablet              No Known Allergies      Discharge Disposition:  Home or Self Care    Diet:  Hospital:No active diet order        Discharge Activity:   Advance as tolerated      CODE STATUS:  Code Status and Medical Interventions: CPR (Attempt to Resuscitate); Full Support   Ordered at: 10/05/24 0727     Level Of Support Discussed With:    Patient     Code Status (Patient has no pulse and is not breathing):    CPR (Attempt to Resuscitate)     Medical Interventions (Patient has pulse or is breathing):    Full Support         No future appointments.        Time spent on Discharge including face to face service:  30 minutes    Electronically signed by Keshav Marcano MD, 10/08/24, 2:12 PM  EDT.

## 2024-10-14 ENCOUNTER — TELEPHONE (OUTPATIENT)
Dept: CARDIOLOGY | Facility: CLINIC | Age: 64
End: 2024-10-14
Payer: MEDICARE

## 2024-10-14 NOTE — TELEPHONE ENCOUNTER
The [facility] received a fax that requires your attention. The document has been indexed to the patient’s chart for your review.      Reason for sending: EXTERNAL MEDICAL RECORD NOTIFICATION     Documents Description: SHORT TERM DISABILITY PAPERWORK-[employer]-10.14.24    Name of Sender: [employer]     Date Indexed: 10.14.24

## 2024-10-16 ENCOUNTER — TELEPHONE (OUTPATIENT)
Dept: CARDIOLOGY | Facility: CLINIC | Age: 64
End: 2024-10-16
Payer: MEDICARE

## 2024-10-16 NOTE — TELEPHONE ENCOUNTER
The [facility] received a fax that requires your attention. The document has been indexed to the patient’s chart for your review.      Reason for sending: EXTERNAL MEDICAL RECORD NOTIFICATION     Documents Description: CRITICAL ILLNESS FORM-[employer]-10.16.24    Name of Sender: [employer]    Date Indexed: 10.16.24    REQUESTING TO BE FILLED AND PUT INTO [employer]

## 2024-10-24 NOTE — TELEPHONE ENCOUNTER
Caller: [email or web address]    Relationship: Emergency Contact    Best call back number: [phone]     What is the best time to reach you: ANYTIME    Who are you requesting to speak with (clinical staff, provider,  specific staff member): CLINICAL STAFF    What was the call regarding: PATIENT WAS IN THE HOSPITAL A COUPLE OF WEEKS AGO WHERE A CATH AND STENT WERE PERFORMED. HE DROPPED OFF [employer] PAPERWORK AND DISABILITY PAPERWORK TO FILL OUT, HOWEVER THE COMPANY NEEDS MORE INFORMATION TO PROCESS HIS CLAIM.      1. ADMISSION SUMMARY  2. DISCHARGE SUMMARY  3. DIAGNOSIS DOCUMENTATION (I.E. LABS, EKG)      POLICY NUMBER: [id number]  CLAIM NUMBER: [id number]  FAX: [phone]

## 2024-10-29 ENCOUNTER — TELEPHONE (OUTPATIENT)
Dept: CARDIOLOGY | Facility: CLINIC | Age: 64
End: 2024-10-29
Payer: MEDICARE

## 2024-10-29 NOTE — TELEPHONE ENCOUNTER
Caller: SINGLETARYVIVEK    Relationship: Emergency Contact    Best call back number: [phone] (home)          Who are you requesting to speak with (clinical staff, provider,  specific staff member): CLINICAL       What was the call regarding: PLEASE CALL PATIENT OR SEND MESSAGE TO FOLLOW UP ON PAPERWORK REQUEST . PREVIOUS MESSAGE AND SCANS IN CHART REGARDING  PAPERWORK.     Is it okay if the provider responds through [facility]: YES

## 2024-10-30 LAB
QT INTERVAL: 400 MS
QT INTERVAL: 449 MS
QT INTERVAL: 455 MS
QTC INTERVAL: 431 MS
QTC INTERVAL: 442 MS
QTC INTERVAL: 450 MS

## (undated) DEVICE — CATH F6 ST PIG 155 110CM 6SH: Brand: SUPER TORQUE

## (undated) DEVICE — NC TREK NEO™ CORONARY DILATATION CATHETER 3.00 MM X 15 MM / RAPID-EXCHANGE: Brand: NC TREK NEO™

## (undated) DEVICE — KT CATH INDIGO RX ASP 140CM

## (undated) DEVICE — SI AVANTI+ 7F MID W/O GW&OBT: Brand: AVANTI

## (undated) DEVICE — DGW .035 FC J3MM 150CM TEF HEP: Brand: EMERALD

## (undated) DEVICE — PERCLOSE™ PROSTYLE™ SUTURE-MEDIATED CLOSURE AND REPAIR SYSTEM: Brand: PERCLOSE™ PROSTYLE™

## (undated) DEVICE — NC TREK NEO™ CORONARY DILATATION CATHETER 2.50 X 15 MM / RAPID-EXCHANGE: Brand: NC TREK NEO™

## (undated) DEVICE — CATH IMG IVUS EAGLE EYE PLATIN RX DIGITAL .014IN 5FR

## (undated) DEVICE — CATH F6 ST JL 4 100CM: Brand: SUPERTORQUE

## (undated) DEVICE — CANSTR COL ENGINE FOR INDIGO SYS

## (undated) DEVICE — 6F .070 JR 4 100CM: Brand: CORDIS

## (undated) DEVICE — RUNTHROUGH NS EXTRA FLOPPY PTCA GUIDEWIRE: Brand: RUNTHROUGH

## (undated) DEVICE — NC TREK NEO™ CORONARY DILATATION CATHETER 3.50 MM X 15 MM / RAPID-EXCHANGE: Brand: NC TREK NEO™